# Patient Record
(demographics unavailable — no encounter records)

---

## 2025-03-24 NOTE — ASSESSMENT
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 declined germline testing. Neoadjuvant pembrolizumab started 9/15/20. s/p C4 on 11/17/20. S/p Robotic distal gastrectomy 1/6/21 with pathCR. s/p C12 (adjuvant) Pembrolizumab on 7/13/21. C13 pembrolizumab was discontinued d/t ongoing nonproductive cough. Has been JU since.   Plan: #) Gastric cancer/MSI-H [id number], s/p neoadjuvant pembrolizumab, s/p robotic distal gastrectomy (R0 gastrectomy) 1/6/21 with pathologic CR; followed by adjuvant pembrolizumab through 7/13/21. - CT abdomen/pelvis in 12/2023 showed a potentially concerning finding: increased size of borderline enlarged rosa-cecal lymph nodes in the RLW + mid abdomen. Largest node is 10 mm.  We recommended she see GI for colonoscopy + get repeat CT scan after a short interval. - fortunately, repeat CT scan on 3/4/24 shows regression of those lymph nodes closer to normal sizes.  No other findings for cancer recurrence/metastasis.   - [facility] CT DNA test was negative in 12/2023 for ctDNA - refused genetic counseling for Flores Syndrome  - CT c/a/p in March/7/2025 showed no recurrence or metastatic lesion. Stable 1.8 cm right adnexal cystic lesion noted. - CBC/CMP reviewed, reassuring.   - return precaution reviewed. -  Continue active surveillance. Plan for repeat CTC/A/P annually and follow up in 6 months. - Recommended getting colonosopy specially if any changed in bowel habits.  # Concern for chronic bronchits - Continues to have long term cough. - Provided referral to Pulmonologist, Dr. Paige  # h/o vit D insufficiency - Completed high dose vit D. C/ w multivitamin  # s/p Gastrectomy - Given history of distal gastrectomy would need to monitor CBC for anemia. - Need to monitor Fe/B12 and Vitamin D levels annually, Ferritin from today is low 16, Iron and TIBC pending - Recommended continuing multivitamin.  # Health maintenance - s/p colonoscopy in 4/2022 with Dr. Denny - reassuring - 5-year interval on the report (4/2027) - given concern for possible flores syn - should have annual mammogram. Last Mammo on 9/24/24. Recommeded getting repeat mammogram this summer. - f/u with PCP, dental clearing, annual dermatology f/u for skin exam  - well balanced diet, regular exercise   # constipation - chronic, taking senna tea daily with help - f/u with GI or PCP if worse  RTC in 10/24/2025. labs- CBC, CMP, CEA, b12/folate  , Vit D  please see attending physician addendum below

## 2025-03-24 NOTE — END OF VISIT
[] : Fellow [FreeTextEntry3] : I evaluated the patient with the Oncology fellow, Dr Kati Cortes.  Ms Baca presents for follow up of MSI-H, resected gastric cancer.  Her main complaint at this visit is cough.  She has chronic cough with sputum and recently was put on antibiotics again for this problem.  She is also depressed, citing many factors for her low mood, including the ongoing war in her home country.  Exam as above.  Labs reviewed.  Plan: #  gastric cancer --CT c/a/p reviewed - no concerns for recurrence or metastasis --clinically the patient is stable - no concerning symptoms --CBC and CMP are reviewed and reassuring.  CEA WNL. --repeat CT c/a/p in 1 year.  #  s/p gastrectomy --following for vitamin deficiency.  She has mild iron deficiency in benita absence of anemia.  Encouraged MVI. --repeat B12, iron panel, vit D annually  #  chronic cough - advised consultation w/ pulmonologist.  Provided Dr Paige's info.  #  preventive health care.  encouraged f/u with PCP.  --last colonoscopy was 2022, cleared x 5 years. --last mammogram 9/2024, repeat in 1 year.

## 2025-03-24 NOTE — ASSESSMENT
[FreeTextEntry1] : 70 year female presents for follow up of a MSI-H [id number] gastric cancer. 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 declined germline testing. Neoadjuvant pembrolizumab started 9/15/20. s/p C4 on 11/17/20. S/p Robotic distal gastrectomy 1/6/21 with pathCR. s/p C12 (adjuvant) Pembrolizumab on 7/13/21. C13 pembrolizumab was discontinued d/t ongoing nonproductive cough. Has been JU since.   Plan: #) Gastric cancer/MSI-H [id number], s/p neoadjuvant pembrolizumab, s/p robotic distal gastrectomy (R0 gastrectomy) 1/6/21 with pathologic CR; followed by adjuvant pembrolizumab through 7/13/21. - CT abdomen/pelvis in 12/2023 showed a potentially concerning finding: increased size of borderline enlarged rosa-cecal lymph nodes in the RLW + mid abdomen. Largest node is 10 mm.  We recommended she see GI for colonoscopy + get repeat CT scan after a short interval. - fortunately, repeat CT scan on 3/4/24 shows regression of those lymph nodes closer to normal sizes.  No other findings for cancer recurrence/metastasis.   - [facility] CT DNA test was negative in 12/2023 for ctDNA - refused genetic counseling for Flores Syndrome  - CT c/a/p in March/7/2025 showed no recurrence or metastatic lesion. Stable 1.8 cm right adnexal cystic lesion noted. - CBC/CMP reviewed, reassuring.   - return precaution reviewed. -  Continue active surveillance. Plan for repeat CTC/A/P annually and follow up in 6 months. - Recommended getting colonosopy specially if any changed in bowel habits.  # Concern for chronic bronchits - Continues to have long term cough. - Provided referral to Pulmonologist, Dr. Paige  # h/o vit D insufficiency - Completed high dose vit D. C/ w multivitamin  # s/p Gastrectomy - Given history of distal gastrectomy would need to monitor CBC for anemia. - Need to monitor Fe/B12 and Vitamin D levels annually, Ferritin from today is low 16, Iron and TIBC pending - Recommended continuing multivitamin.  # Health maintenance - s/p colonoscopy in 4/2022 with Dr. Denny - reassuring - 5-year interval on the report (4/2027) - given concern for possible flores syn - should have annual mammogram. Last Mammo on 9/24/24. Recommeded getting repeat mammogram this summer. - f/u with PCP, dental clearing, annual dermatology f/u for skin exam  - well balanced diet, regular exercise   # constipation - chronic, taking senna tea daily with help - f/u with GI or PCP if worse  RTC in 10/24/2025. labs- CBC, CMP, CEA, b12/folate  , Vit D  please see attending physician addendum below

## 2025-03-24 NOTE — PHYSICAL EXAM
[Fully active, able to carry on all pre-disease performance without restriction] : Status 0 - Fully active, able to carry on all pre-disease performance without restriction [Normal] : affect appropriate [de-identified] : normal RR, breathing pattern [de-identified] : supple [de-identified] : normal HR [de-identified] : not distended, soft, non tenderness

## 2025-03-24 NOTE — HISTORY OF PRESENT ILLNESS
[de-identified] : 70 year female  presents for follow up of a MSI-H [id number] gastric cancer.  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  declined germline testing.  Neoadjuvant pembrolizumab started 9/15/20.  s/p C4 on 11/17/20. S/p  Robotic distal gastrectomy 1/6/21 with pathCR. s/p C12 (adjuvant) Pembrolizumab on 7/13/21. C13 pembrolizumab was discontinued d/t ongoing nonproductive cough.    Onc Hx;  She c/o post prandial epigastric abdominal pain with radiation to back for about a year. Pain occurred 1-2x/day and described as burning. Spontaneous resolution. Also c/o heartburn, bloating, food regurgitation. Nocturnal awakenings associated with pain and sour taste in mouth, despite otc zantac. She had an upper EGD 8/14/2020 she was found to have a mass in the antrum, pathology revealed adenocarcinoma, poorly differentiated MSI-H.   FMH:  Her brother in [facility] had gastric ca at age 66, her mom's sis had breast ca at age 68.  paternal uncle w/ gastric cancer SH retired, used to work in nursing home, nonsmoker, mild alcohol use  8/17/20 CT AP was negative for adenopathy or metastatic disease. She had an EUS on 8/25/2020 and was found to have a 26mm mass in the antrum and in the prepyloric region of the stomach. A laparoscopic evaluation 9/1/20 reportedly had no mets.  9/11/20 CT Chest: No evidence of intrathoracic metastasis.  12/4/20 CT CAP -There is mild apparent wall thickening of the gastric antrum which is underdistended likely due to known neoplasm. There is a small periampullary duodenal diverticulum. Evaluation of the bowel demonstrates no other abnormalities. -Stable evaluation. No evidence of local invasion, lymphadenopathy or distant metastasis. -1.5 cm right ovarian cyst. Recommend follow-up on future imaging.  Pathology   1/6/21    Surgical Final Report Final Diagnosis 1. D2 lymph nodes, dissection: - 10 lymph nodes negative for carcinoma, 0/10 2. Distal stomach, distal gastrectomy : - No residual viable malignant cells (complete response to preoperative chemotherapy, score  0) - Margins negative for malignancy or dysplasia - Fibrotic tumor bed identified (completely submitted) - Moderate chronic and focal acute gastritis with intestinal metaplasia and focal atrophy - 24 lymph nodes negative for carcinoma, 0/24 5/17/2021 CT CAP:  1. Interval distal gastrectomy with gastrojejunostomy, without abnormality anastomosis. 2. No evidence of metastatic lymphadenopathy or distant metastasis. 3. Unchanged solid pulmonary nodules, likely inflammatory.  8/16/21 CT CAP Lungs/Pleura/Airways: No suspicious pulmonary nodule or mass. Subpleural micronodules in the right lower lobe (series 3 images 159 and 186) and small bilateral perifissural nodules are stable since 2020. Scattered small bilateral air-filled pulmonary cysts, unchanged. No pleural effusion or pneumothorax. Patent central airways. No evidence of metastatic disease. No acute pulmonary findings.  3/15/22 CT ABDOMEN AND PELVIS IC and CT CHEST IC No thoracic, abdominal, pelvic, groin adenopathy. No recurrent or metastatic tumor. No splenomegaly. No focal liver lesion. Status post subtotal gastrectomy. Status post hysterectomy.  12/30/22 CT CAP Negative for recurrent or metastatic disease.  CT C/A/P 12/2/2023 -  1.  Subtle but definite increase in size of multiple peritoneal lymph nodes, particularly noted in the right mid to lower abdomen as described above. No evidence of active bowel inflammatory disease, however, subtle GI inflammation cannot be excluded. Cannot totally exclude metastasis. Recommend close interval follow-up, 3 months for assessment of stability. PET/CT not likely to be helpful as both inflammatory and neoplastic etiologies would be positive. 2.  Stable post distal gastrectomy and gastrojejunostomy surgical bed.  3/4/24: CT Abdomen and Pelvis- Negative for adenopathy or metastatic disease.  3/7/25: CT CAP: No recurrence or metastatic lesion.  [de-identified] : here for f/u w/ daughter She mentioned about constipation, well managed with senna tea daily. No new changes in bowel habbits. She has a good appetite, weight increaed from 153 to 158 lb, no nausea/vomiting/diarrhea  She mentioned having recent infection and was seen by her PCP. She completed 3 days of antibiotics however her cough hasnt improved. She continues to have expectoration.

## 2025-03-24 NOTE — ASSESSMENT
[FreeTextEntry1] : 70 year female presents for follow up of a MSI-H [id number] gastric cancer. 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 declined germline testing. Neoadjuvant pembrolizumab started 9/15/20. s/p C4 on 11/17/20. S/p Robotic distal gastrectomy 1/6/21 with pathCR. s/p C12 (adjuvant) Pembrolizumab on 7/13/21. C13 pembrolizumab was discontinued d/t ongoing nonproductive cough. Has been UJ since.   Plan: #) Gastric cancer/MSI-H [id number], s/p neoadjuvant pembrolizumab, s/p robotic distal gastrectomy (R0 gastrectomy) 1/6/21 with pathologic CR; followed by adjuvant pembrolizumab through 7/13/21. - CT abdomen/pelvis in 12/2023 showed a potentially concerning finding: increased size of borderline enlarged rosa-cecal lymph nodes in the RLW + mid abdomen. Largest node is 10 mm.  We recommended she see GI for colonoscopy + get repeat CT scan after a short interval. - fortunately, repeat CT scan on 3/4/24 shows regression of those lymph nodes closer to normal sizes.  No other findings for cancer recurrence/metastasis.   - [facility] CT DNA test was negative in 12/2023 for ctDNA - refused genetic counseling for Flores Syndrome  - CT c/a/p in March/7/2025 showed no recurrence or metastatic lesion. Stable 1.8 cm right adnexal cystic lesion noted. - CBC/CMP reviewed, reassuring.   - return precaution reviewed. -  Continue active surveillance. Plan for repeat CTC/A/P annually and follow up in 6 months. - Recommended getting colonosopy specially if any changed in bowel habits.  # Concern for chronic bronchits - Continues to have long term cough. - Provided referral to Pulmonologist, Dr. Paige  # h/o vit D insufficiency - Completed high dose vit D. C/ w multivitamin  # s/p Gastrectomy - Given history of distal gastrectomy would need to monitor CBC for anemia. - Need to monitor Fe/B12 and Vitamin D levels annually, Ferritin from today is low 16, Iron and TIBC pending - Recommended continuing multivitamin.  # Health maintenance - s/p colonoscopy in 4/2022 with Dr. Denny - reassuring - 5-year interval on the report (4/2027) - given concern for possible flores syn - should have annual mammogram. Last Mammo on 9/24/24. Recommeded getting repeat mammogram this summer. - f/u with PCP, dental clearing, annual dermatology f/u for skin exam  - well balanced diet, regular exercise   # constipation - chronic, taking senna tea daily with help - f/u with GI or PCP if worse  RTC in 10/24/2025. labs- CBC, CMP, CEA, b12/folate  , Vit D  please see attending physician addendum below

## 2025-03-24 NOTE — PHYSICAL EXAM
[Fully active, able to carry on all pre-disease performance without restriction] : Status 0 - Fully active, able to carry on all pre-disease performance without restriction [Normal] : affect appropriate [de-identified] : supple [de-identified] : normal RR, breathing pattern [de-identified] : normal HR [de-identified] : not distended, soft, non tenderness

## 2025-03-24 NOTE — HISTORY OF PRESENT ILLNESS
[de-identified] : 70 year female  presents for follow up of a MSI-H [id number] gastric cancer.  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  declined germline testing.  Neoadjuvant pembrolizumab started 9/15/20.  s/p C4 on 11/17/20. S/p  Robotic distal gastrectomy 1/6/21 with pathCR. s/p C12 (adjuvant) Pembrolizumab on 7/13/21. C13 pembrolizumab was discontinued d/t ongoing nonproductive cough.    Onc Hx;  She c/o post prandial epigastric abdominal pain with radiation to back for about a year. Pain occurred 1-2x/day and described as burning. Spontaneous resolution. Also c/o heartburn, bloating, food regurgitation. Nocturnal awakenings associated with pain and sour taste in mouth, despite otc zantac. She had an upper EGD 8/14/2020 she was found to have a mass in the antrum, pathology revealed adenocarcinoma, poorly differentiated MSI-H.   FMH:  Her brother in [facility] had gastric ca at age 66, her mom's sis had breast ca at age 68.  paternal uncle w/ gastric cancer SH retired, used to work in nursing home, nonsmoker, mild alcohol use  8/17/20 CT AP was negative for adenopathy or metastatic disease. She had an EUS on 8/25/2020 and was found to have a 26mm mass in the antrum and in the prepyloric region of the stomach. A laparoscopic evaluation 9/1/20 reportedly had no mets.  9/11/20 CT Chest: No evidence of intrathoracic metastasis.  12/4/20 CT CAP -There is mild apparent wall thickening of the gastric antrum which is underdistended likely due to known neoplasm. There is a small periampullary duodenal diverticulum. Evaluation of the bowel demonstrates no other abnormalities. -Stable evaluation. No evidence of local invasion, lymphadenopathy or distant metastasis. -1.5 cm right ovarian cyst. Recommend follow-up on future imaging.  Pathology   1/6/21    Surgical Final Report Final Diagnosis 1. D2 lymph nodes, dissection: - 10 lymph nodes negative for carcinoma, 0/10 2. Distal stomach, distal gastrectomy : - No residual viable malignant cells (complete response to preoperative chemotherapy, score  0) - Margins negative for malignancy or dysplasia - Fibrotic tumor bed identified (completely submitted) - Moderate chronic and focal acute gastritis with intestinal metaplasia and focal atrophy - 24 lymph nodes negative for carcinoma, 0/24 5/17/2021 CT CAP:  1. Interval distal gastrectomy with gastrojejunostomy, without abnormality anastomosis. 2. No evidence of metastatic lymphadenopathy or distant metastasis. 3. Unchanged solid pulmonary nodules, likely inflammatory.  8/16/21 CT CAP Lungs/Pleura/Airways: No suspicious pulmonary nodule or mass. Subpleural micronodules in the right lower lobe (series 3 images 159 and 186) and small bilateral perifissural nodules are stable since 2020. Scattered small bilateral air-filled pulmonary cysts, unchanged. No pleural effusion or pneumothorax. Patent central airways. No evidence of metastatic disease. No acute pulmonary findings.  3/15/22 CT ABDOMEN AND PELVIS IC and CT CHEST IC No thoracic, abdominal, pelvic, groin adenopathy. No recurrent or metastatic tumor. No splenomegaly. No focal liver lesion. Status post subtotal gastrectomy. Status post hysterectomy.  12/30/22 CT CAP Negative for recurrent or metastatic disease.  CT C/A/P 12/2/2023 -  1.  Subtle but definite increase in size of multiple peritoneal lymph nodes, particularly noted in the right mid to lower abdomen as described above. No evidence of active bowel inflammatory disease, however, subtle GI inflammation cannot be excluded. Cannot totally exclude metastasis. Recommend close interval follow-up, 3 months for assessment of stability. PET/CT not likely to be helpful as both inflammatory and neoplastic etiologies would be positive. 2.  Stable post distal gastrectomy and gastrojejunostomy surgical bed.  3/4/24: CT Abdomen and Pelvis- Negative for adenopathy or metastatic disease.  3/7/25: CT CAP: No recurrence or metastatic lesion.  [de-identified] : here for f/u w/ daughter She mentioned about constipation, well managed with senna tea daily. No new changes in bowel habbits. She has a good appetite, weight increaed from 153 to 158 lb, no nausea/vomiting/diarrhea  She mentioned having recent infection and was seen by her PCP. She completed 3 days of antibiotics however her cough hasnt improved. She continues to have expectoration.

## 2025-03-24 NOTE — HISTORY OF PRESENT ILLNESS
[de-identified] : 70 year female  presents for follow up of a MSI-H [id number] gastric cancer.  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  declined germline testing.  Neoadjuvant pembrolizumab started 9/15/20.  s/p C4 on 11/17/20. S/p  Robotic distal gastrectomy 1/6/21 with pathCR. s/p C12 (adjuvant) Pembrolizumab on 7/13/21. C13 pembrolizumab was discontinued d/t ongoing nonproductive cough.    Onc Hx;  She c/o post prandial epigastric abdominal pain with radiation to back for about a year. Pain occurred 1-2x/day and described as burning. Spontaneous resolution. Also c/o heartburn, bloating, food regurgitation. Nocturnal awakenings associated with pain and sour taste in mouth, despite otc zantac. She had an upper EGD 8/14/2020 she was found to have a mass in the antrum, pathology revealed adenocarcinoma, poorly differentiated MSI-H.   FMH:  Her brother in [facility] had gastric ca at age 66, her mom's sis had breast ca at age 68.  paternal uncle w/ gastric cancer SH retired, used to work in nursing home, nonsmoker, mild alcohol use  8/17/20 CT AP was negative for adenopathy or metastatic disease. She had an EUS on 8/25/2020 and was found to have a 26mm mass in the antrum and in the prepyloric region of the stomach. A laparoscopic evaluation 9/1/20 reportedly had no mets.  9/11/20 CT Chest: No evidence of intrathoracic metastasis.  12/4/20 CT CAP -There is mild apparent wall thickening of the gastric antrum which is underdistended likely due to known neoplasm. There is a small periampullary duodenal diverticulum. Evaluation of the bowel demonstrates no other abnormalities. -Stable evaluation. No evidence of local invasion, lymphadenopathy or distant metastasis. -1.5 cm right ovarian cyst. Recommend follow-up on future imaging.  Pathology   1/6/21    Surgical Final Report Final Diagnosis 1. D2 lymph nodes, dissection: - 10 lymph nodes negative for carcinoma, 0/10 2. Distal stomach, distal gastrectomy : - No residual viable malignant cells (complete response to preoperative chemotherapy, score  0) - Margins negative for malignancy or dysplasia - Fibrotic tumor bed identified (completely submitted) - Moderate chronic and focal acute gastritis with intestinal metaplasia and focal atrophy - 24 lymph nodes negative for carcinoma, 0/24 5/17/2021 CT CAP:  1. Interval distal gastrectomy with gastrojejunostomy, without abnormality anastomosis. 2. No evidence of metastatic lymphadenopathy or distant metastasis. 3. Unchanged solid pulmonary nodules, likely inflammatory.  8/16/21 CT CAP Lungs/Pleura/Airways: No suspicious pulmonary nodule or mass. Subpleural micronodules in the right lower lobe (series 3 images 159 and 186) and small bilateral perifissural nodules are stable since 2020. Scattered small bilateral air-filled pulmonary cysts, unchanged. No pleural effusion or pneumothorax. Patent central airways. No evidence of metastatic disease. No acute pulmonary findings.  3/15/22 CT ABDOMEN AND PELVIS IC and CT CHEST IC No thoracic, abdominal, pelvic, groin adenopathy. No recurrent or metastatic tumor. No splenomegaly. No focal liver lesion. Status post subtotal gastrectomy. Status post hysterectomy.  12/30/22 CT CAP Negative for recurrent or metastatic disease.  CT C/A/P 12/2/2023 -  1.  Subtle but definite increase in size of multiple peritoneal lymph nodes, particularly noted in the right mid to lower abdomen as described above. No evidence of active bowel inflammatory disease, however, subtle GI inflammation cannot be excluded. Cannot totally exclude metastasis. Recommend close interval follow-up, 3 months for assessment of stability. PET/CT not likely to be helpful as both inflammatory and neoplastic etiologies would be positive. 2.  Stable post distal gastrectomy and gastrojejunostomy surgical bed.  3/4/24: CT Abdomen and Pelvis- Negative for adenopathy or metastatic disease.  3/7/25: CT CAP: No recurrence or metastatic lesion.  [de-identified] : here for f/u w/ daughter She mentioned about constipation, well managed with senna tea daily. No new changes in bowel habbits. She has a good appetite, weight increaed from 153 to 158 lb, no nausea/vomiting/diarrhea  She mentioned having recent infection and was seen by her PCP. She completed 3 days of antibiotics however her cough hasnt improved. She continues to have expectoration.

## 2025-03-24 NOTE — PHYSICAL EXAM
[Fully active, able to carry on all pre-disease performance without restriction] : Status 0 - Fully active, able to carry on all pre-disease performance without restriction [Normal] : affect appropriate [de-identified] : normal RR, breathing pattern [de-identified] : supple [de-identified] : normal HR [de-identified] : not distended, soft, non tenderness

## 2025-04-14 NOTE — PHYSICAL EXAM
[No Acute Distress] : no acute distress [Normal Rate/Rhythm] : normal rate/rhythm [Normal S1, S2] : normal s1, s2 [No Resp Distress] : no resp distress [Clear to Auscultation Bilaterally] : clear to auscultation bilaterally [No Clubbing] : no clubbing [No Edema] : no edema [Normal Affect] : normal affect

## 2025-04-14 NOTE — ASSESSMENT
[FreeTextEntry1] : Data reviewed:  CT chest [facility] 3/2025 personally reviewed : unremarkable airways and parenchyma  [facility] 04/14/2025 : normal, FEV1 113% / FENO 15  Impression: Chronic cough  Plan: The cause of the cough is not immediately apparent from evaluation today. I discussed common causes of chronic cough and outlined 3 possibilities for the patient. One, we could empirically treat her for PND and GERD and see if this improves the cough. Two, we could do further investigations, which would start with methacholine challenge and ENT evaluation by a [facility] ENT. Three, we could be reassured that there is no serious lung pathology and just take a wait and see approach. The patient favors this last approach. This is fine. She will return if the cough worsens and we can assess further.